# Patient Record
Sex: FEMALE | Race: BLACK OR AFRICAN AMERICAN | NOT HISPANIC OR LATINO | URBAN - METROPOLITAN AREA
[De-identification: names, ages, dates, MRNs, and addresses within clinical notes are randomized per-mention and may not be internally consistent; named-entity substitution may affect disease eponyms.]

---

## 2018-07-17 ENCOUNTER — HISTORICAL (OUTPATIENT)
Dept: ADMINISTRATIVE | Facility: HOSPITAL | Age: 18
End: 2018-07-17

## 2021-08-10 ENCOUNTER — HISTORICAL (OUTPATIENT)
Dept: ADMINISTRATIVE | Facility: HOSPITAL | Age: 21
End: 2021-08-10

## 2021-08-10 LAB — SARS-COV-2 RNA RESP QL NAA+PROBE: NOT DETECTED

## 2022-04-10 ENCOUNTER — HISTORICAL (OUTPATIENT)
Dept: ADMINISTRATIVE | Facility: HOSPITAL | Age: 22
End: 2022-04-10

## 2022-04-24 VITALS
WEIGHT: 293 LBS | SYSTOLIC BLOOD PRESSURE: 114 MMHG | HEIGHT: 67 IN | BODY MASS INDEX: 45.99 KG/M2 | OXYGEN SATURATION: 100 % | DIASTOLIC BLOOD PRESSURE: 81 MMHG

## 2024-02-15 DIAGNOSIS — K59.00 CONSTIPATION, UNSPECIFIED CONSTIPATION TYPE: Primary | ICD-10-CM

## 2024-09-05 DIAGNOSIS — R10.811 ABDOMINAL TENDERNESS, RIGHT UPPER QUADRANT: Primary | ICD-10-CM

## 2024-09-18 ENCOUNTER — HOSPITAL ENCOUNTER (OUTPATIENT)
Dept: RADIOLOGY | Facility: HOSPITAL | Age: 24
Discharge: HOME | End: 2024-09-18
Payer: MEDICAID

## 2024-09-18 DIAGNOSIS — R10.811 ABDOMINAL TENDERNESS, RIGHT UPPER QUADRANT: ICD-10-CM

## 2024-09-18 PROCEDURE — 76700 US EXAM ABDOM COMPLETE: CPT | Mod: TC

## 2024-12-20 ENCOUNTER — TELEPHONE (OUTPATIENT)
Dept: NUTRITION | Facility: HOSPITAL | Age: 24
End: 2024-12-20
Payer: MEDICAID

## 2024-12-20 NOTE — TELEPHONE ENCOUNTER
RDN attempted to contact pt regarding referral to schedule for nutrition appt. No answer; RDN left voicemail with contact information.

## 2025-02-17 ENCOUNTER — TELEPHONE (OUTPATIENT)
Dept: NUTRITION | Facility: HOSPITAL | Age: 25
End: 2025-02-17
Payer: MEDICAID

## 2025-02-26 NOTE — PROGRESS NOTES
Nutrition Note: 2025   Referring Provider: Christine Peterson FN*  Reason for visit: Obesity/Weight Management  and Pre-DM  Consultation Time: 60 Minutes     A = NUTRITION ASSESSMENT   Patient Information:    Librado Casillas  : 2000   24 y.o. female    Allergies/Intolerances: Lactose Intolerance  Social Data: lives with parents. Accompanied by  self .  Anthropometrics:     Wt: 119.5 kg                                   Ht   1.701 m  BMI: There is no height or weight on file to calculate BMI.  IBW: 61.2 kg (195% IBW)    Nutrition Risk: Patient doesn't meet criteriaat least 2 characteristics of the ASPEN/AND criteria for  Malnutrition in context of  Malnutrition    Energy Intake  doesn't meet criteria  Interpretation of Weight Loss: doesn't meet criteria  Physical Findings (Body Fat) : doesn't meet criteria  Physical Findings (Muscle Mass: doesn't meet criteria  Fluid Accumulation []: unable to assess  Other:     Supplements/Vitamins:    MVI/Supp: Reviewed  Drug/Nutrient interactions: Reviewed Activity Level:     Sedentary      Form of Activity:      Food/Nutrition-related hx:        Food Security  Is patient able to sufficiently able to prepare meals at home? [x] Yes  [] No []N/A  If no, does patient have help cooking, preparing, and serving meals at home? [] Yes  [] No [x] N/A    Diet/PO Recall:   Appetite: Good  Fluid Intake: Adequate  Diet Recall:  Breakfast: eggs, bread, pancakes   Lunch: meat rice and gravy  Dinner: meat rice and gravy  Snacks: 1-2x/day  Drinks: water, powerade  ONS:   N/V/C/D: No GI Symptoms Noted  Eating out: 0-1x/week.   Cultural/Spiritual/Personal Preferences: no pork, lactose intolerant  Patient Notes/Reports:  Pt concerned about wt gain since last year, about 10#,  Pt states have been eating more bread products. MD dx her with prediabetes and wants to try wt loss, so she may not have to start medications. Pt provide diet recall and see above. Pt states joining a gym  membership. Provide portion control handout, General Healthy Nutrition, exercise 4 times a week (cardio) and 2-3 times per week strength straining. Educated on consuming more fruits and vegetables, less sweets, which patient states has been eating at time, as well as whoel grains, lean meats. Verbalized understanding.  Except good compliance. Discuss goal setting with patient to help with wt management. Pt wants to los 20# by June 2025.   Medical Tests and Procedures:  Problem List[1]   No past medical history on file.  No past surgical history on file.    No family history on file.  Social History     Socioeconomic History    Marital status: Single     No current outpatient medications   Labs:    Lab Results   Component Value Date    WBC 3.6 (L) 01/02/2020    HGB 11.2 (L) 01/02/2020    HCT 39.1 01/02/2020     01/02/2020    K 3.9 01/02/2020    CALCIUM 9.1 01/02/2020         D = NUTRITION DIAGNOSIS    PES Statement:   Primary Problem: Overweight/Obesity  related to excessive energy intake and physical inactivity  as evidenced by diet recall.            I = NUTRITION INTERVENTION   Discussed healthy plate method on healthy eating, incorporating more fruits/vegetables, whole grains, and eliminating high calorie/sugary beverages.  Discussed sugary foods and/or beverages (potential health consequences of excessive sugar intake, ways to reduce sugar intake, healthy beverage alternatives, etc.). Discussed recommended servings of fruit/vegetable per day and food sources of such at age appropriate serving sizes.  Discussed nutrient-dense meals and snacks versus empty-calories.  Discussed recommended fiber intake and food sources of such. Discussed benefits of adequate hydration and recommended fluid intake. Discussed physical activity guidelines, per AAP, and its associated benefits.  Answered parents/patient's questions appropriately.      Patient  active and engaged during session and verbalized desire to make  changes. Contact information provided, understanding verbalized and compliance expected.   Estimated Energy/Fluid Requirements:   Weight used: .5 kg  Calories: 2390 kcal/day (20 kcal/kg)  Protein: 96 g/day (0.8 g/kg)  Fluid: 2390 mL/day (1 mL/kcal)    Education Materials Provided:   Nutrition Plan, Healthy Plate Method, Nutrition Facts Label, and Physical Activity Guide    Recommendations:   Ensure balanced eating pattern of 3 meals, 1-2 snacks daily. Avoid skipped meals.    Keep portions appropriate using body (fist, palm, etc)   Create nutrient-dense meals and snacks. Focus on adequate nutrition including lean proteins, whole grains/fiber, and increasing overall fruit/vegetable intake.    Avoid/limit fried foods, fast food, and high calorie beverages. Shoot for limiting fast food to 1x/week.    Consume zero-calorie, zero-sugary beverages and choose water more often (crystal light, unsweet tea, diet soda, G2, Powerade zero, vitamin water zero, and skim/1%milk)   Meet physical activity goal of 60 minutes daily.           M/E = NUTRITION MONITORING AND EVALUATION   Goal 1: Weight loss of 8# (5-10%) per month or up to 24 within 6 months.   Indicator: Weight/BMI    Goal 2: Diet recall shows decrease/improvements in high calorie foods/drinks and consuming balanced eating pattern including lean proteins, whole grains, and fruit/vegetables by next RD visit.   Indicator: Diet Recall     F/U:   2 Months    Communication with provider via Epic    Signature: Maria Isabel Thompson RDN, LDN          [1] There is no problem list on file for this patient.

## 2025-02-27 ENCOUNTER — NUTRITION (OUTPATIENT)
Dept: NUTRITION | Facility: HOSPITAL | Age: 25
End: 2025-02-27
Payer: MEDICAID

## 2025-02-27 DIAGNOSIS — R73.03 PRE-DIABETES: Primary | ICD-10-CM

## 2025-02-27 PROCEDURE — 97802 MEDICAL NUTRITION INDIV IN: CPT

## 2025-03-11 DIAGNOSIS — R10.2 PELVIC PAIN SYNDROME: Primary | ICD-10-CM

## 2025-03-11 DIAGNOSIS — N92.6 IRREGULAR MENSTRUAL CYCLE: ICD-10-CM

## 2025-03-24 ENCOUNTER — HOSPITAL ENCOUNTER (OUTPATIENT)
Dept: RADIOLOGY | Facility: HOSPITAL | Age: 25
Discharge: HOME | End: 2025-03-24
Attending: NURSE PRACTITIONER
Payer: MEDICAID

## 2025-03-24 DIAGNOSIS — R10.2 PELVIC PAIN SYNDROME: ICD-10-CM

## 2025-03-24 DIAGNOSIS — N92.6 IRREGULAR MENSTRUAL CYCLE: ICD-10-CM

## 2025-03-24 PROCEDURE — 76856 US EXAM PELVIC COMPLETE: CPT | Mod: TC

## 2025-04-05 ENCOUNTER — HOSPITAL ENCOUNTER (EMERGENCY)
Facility: HOSPITAL | Age: 25
Discharge: HOME | End: 2025-04-05
Attending: OBSTETRICS & GYNECOLOGY
Payer: MEDICAID

## 2025-04-05 VITALS
HEART RATE: 71 BPM | DIASTOLIC BLOOD PRESSURE: 82 MMHG | TEMPERATURE: 98 F | HEIGHT: 67 IN | RESPIRATION RATE: 18 BRPM | SYSTOLIC BLOOD PRESSURE: 139 MMHG | OXYGEN SATURATION: 100 % | WEIGHT: 256 LBS | BODY MASS INDEX: 40.18 KG/M2

## 2025-04-05 DIAGNOSIS — R07.9 CHEST PAIN: ICD-10-CM

## 2025-04-05 DIAGNOSIS — M94.0 COSTAL CHONDRITIS: Primary | ICD-10-CM

## 2025-04-05 LAB
ALBUMIN SERPL-MCNC: 3.5 G/DL (ref 3.5–5)
ALBUMIN/GLOB SERPL: 0.9 RATIO (ref 1.1–2)
ALP SERPL-CCNC: 78 UNIT/L (ref 40–150)
ALT SERPL-CCNC: 23 UNIT/L (ref 0–55)
ANION GAP SERPL CALC-SCNC: 8 MEQ/L
AST SERPL-CCNC: 22 UNIT/L (ref 11–45)
B-HCG UR QL: NEGATIVE
BACTERIA #/AREA URNS AUTO: ABNORMAL /HPF
BASOPHILS # BLD AUTO: 0.04 X10(3)/MCL
BASOPHILS NFR BLD AUTO: 0.5 %
BILIRUB SERPL-MCNC: 0.2 MG/DL
BILIRUB UR QL STRIP.AUTO: NEGATIVE
BNP BLD-MCNC: <10 PG/ML
BUN SERPL-MCNC: 13.9 MG/DL (ref 7–18.7)
CALCIUM SERPL-MCNC: 9.2 MG/DL (ref 8.4–10.2)
CHLORIDE SERPL-SCNC: 110 MMOL/L (ref 98–107)
CLARITY UR: CLEAR
CO2 SERPL-SCNC: 22 MMOL/L (ref 22–29)
COLOR UR AUTO: ABNORMAL
CREAT SERPL-MCNC: 0.89 MG/DL (ref 0.55–1.02)
CREAT/UREA NIT SERPL: 16
EOSINOPHIL # BLD AUTO: 0.14 X10(3)/MCL (ref 0–0.9)
EOSINOPHIL NFR BLD AUTO: 1.8 %
ERYTHROCYTE [DISTWIDTH] IN BLOOD BY AUTOMATED COUNT: 15.7 % (ref 11.5–17)
GFR SERPLBLD CREATININE-BSD FMLA CKD-EPI: >60 ML/MIN/1.73/M2
GLOBULIN SER-MCNC: 3.7 GM/DL (ref 2.4–3.5)
GLUCOSE SERPL-MCNC: 81 MG/DL (ref 74–100)
GLUCOSE UR QL STRIP: NORMAL
HCT VFR BLD AUTO: 36.3 % (ref 37–47)
HGB BLD-MCNC: 11.3 G/DL (ref 12–16)
HGB UR QL STRIP: NEGATIVE
IMM GRANULOCYTES # BLD AUTO: 0.02 X10(3)/MCL (ref 0–0.04)
IMM GRANULOCYTES NFR BLD AUTO: 0.3 %
KETONES UR QL STRIP: NEGATIVE
LEUKOCYTE ESTERASE UR QL STRIP: NEGATIVE
LYMPHOCYTES # BLD AUTO: 2.41 X10(3)/MCL (ref 0.6–4.6)
LYMPHOCYTES NFR BLD AUTO: 30.8 %
MCH RBC QN AUTO: 27.1 PG (ref 27–31)
MCHC RBC AUTO-ENTMCNC: 31.1 G/DL (ref 33–36)
MCV RBC AUTO: 87.1 FL (ref 80–94)
MONOCYTES # BLD AUTO: 0.53 X10(3)/MCL (ref 0.1–1.3)
MONOCYTES NFR BLD AUTO: 6.8 %
MUCOUS THREADS URNS QL MICRO: ABNORMAL /LPF
NEUTROPHILS # BLD AUTO: 4.68 X10(3)/MCL (ref 2.1–9.2)
NEUTROPHILS NFR BLD AUTO: 59.8 %
NITRITE UR QL STRIP: NEGATIVE
NRBC BLD AUTO-RTO: 0 %
PH UR STRIP: 6.5 [PH]
PLATELET # BLD AUTO: 230 X10(3)/MCL (ref 130–400)
PMV BLD AUTO: 11.1 FL (ref 7.4–10.4)
POTASSIUM SERPL-SCNC: 3.7 MMOL/L (ref 3.5–5.1)
PROT SERPL-MCNC: 7.2 GM/DL (ref 6.4–8.3)
PROT UR QL STRIP: NEGATIVE
RBC # BLD AUTO: 4.17 X10(6)/MCL (ref 4.2–5.4)
RBC #/AREA URNS AUTO: ABNORMAL /HPF
SODIUM SERPL-SCNC: 140 MMOL/L (ref 136–145)
SP GR UR STRIP.AUTO: 1.03 (ref 1–1.03)
SQUAMOUS #/AREA URNS LPF: ABNORMAL /HPF
TROPONIN I SERPL-MCNC: <0.01 NG/ML (ref 0–0.04)
UROBILINOGEN UR STRIP-ACNC: NORMAL
WBC # BLD AUTO: 7.82 X10(3)/MCL (ref 4.5–11.5)
WBC #/AREA URNS AUTO: ABNORMAL /HPF

## 2025-04-05 PROCEDURE — 81025 URINE PREGNANCY TEST: CPT

## 2025-04-05 PROCEDURE — 93005 ELECTROCARDIOGRAM TRACING: CPT

## 2025-04-05 PROCEDURE — 85025 COMPLETE CBC W/AUTO DIFF WBC: CPT

## 2025-04-05 PROCEDURE — 83880 ASSAY OF NATRIURETIC PEPTIDE: CPT

## 2025-04-05 PROCEDURE — 81001 URINALYSIS AUTO W/SCOPE: CPT

## 2025-04-05 PROCEDURE — 93010 ELECTROCARDIOGRAM REPORT: CPT | Mod: ,,, | Performed by: INTERNAL MEDICINE

## 2025-04-05 PROCEDURE — 80053 COMPREHEN METABOLIC PANEL: CPT

## 2025-04-05 PROCEDURE — 84484 ASSAY OF TROPONIN QUANT: CPT

## 2025-04-05 PROCEDURE — 99285 EMERGENCY DEPT VISIT HI MDM: CPT | Mod: 25

## 2025-04-05 RX ORDER — DICLOFENAC SODIUM 50 MG/1
50 TABLET, DELAYED RELEASE ORAL 3 TIMES DAILY PRN
Qty: 15 TABLET | Refills: 0 | Status: SHIPPED | OUTPATIENT
Start: 2025-04-05 | End: 2025-04-10

## 2025-04-06 LAB
OHS QRS DURATION: 86 MS
OHS QTC CALCULATION: 429 MS

## 2025-04-06 NOTE — ED PROVIDER NOTES
Encounter Date: 4/5/2025       History     Chief Complaint   Patient presents with    Shortness of Breath     Pt states that she has been experiencing CP since last wk. Pt states that the urgent care prescribed her steroids and ibuprofen no relief with symptoms. Pt states that she is SOB with talking and moving around. Pt denies PMHx      See MDM    The history is provided by the patient. No  was used.     Review of patient's allergies indicates:  No Known Allergies  No past medical history on file.  No past surgical history on file.  No family history on file.  Social History[1]  Review of Systems   Constitutional:  Negative for fever.   Respiratory:  Positive for shortness of breath. Negative for cough.    Cardiovascular:  Positive for chest pain.   Gastrointestinal:  Negative for abdominal pain.   Genitourinary:  Negative for difficulty urinating and dysuria.   Musculoskeletal:  Negative for gait problem.   Skin:  Negative for color change.   Neurological:  Negative for dizziness, speech difficulty and headaches.   Psychiatric/Behavioral:  Negative for hallucinations and suicidal ideas.    All other systems reviewed and are negative.      Physical Exam     Initial Vitals [04/05/25 2010]   BP Pulse Resp Temp SpO2   126/85 81 16 97.5 °F (36.4 °C) 100 %      MAP       --         Physical Exam    Nursing note and vitals reviewed.  Constitutional: She appears well-developed and well-nourished.   HENT:   Head: Normocephalic.   Eyes: EOM are normal.   Neck:   Normal range of motion.  Cardiovascular:  Normal rate, regular rhythm, normal heart sounds and intact distal pulses.           Pulmonary/Chest: Breath sounds normal. No respiratory distress. She exhibits tenderness.   Abdominal: Abdomen is soft. Bowel sounds are normal. There is no abdominal tenderness.   Musculoskeletal:         General: Normal range of motion.      Cervical back: Normal range of motion.     Neurological: She is alert and  oriented to person, place, and time. She has normal strength.   Skin: Skin is warm and dry.   Psychiatric: She has a normal mood and affect. Her behavior is normal. Judgment and thought content normal.         ED Course   Procedures  Labs Reviewed   COMPREHENSIVE METABOLIC PANEL - Abnormal       Result Value    Sodium 140      Potassium 3.7      Chloride 110 (*)     CO2 22      Glucose 81      Blood Urea Nitrogen 13.9      Creatinine 0.89      Calcium 9.2      Protein Total 7.2      Albumin 3.5      Globulin 3.7 (*)     Albumin/Globulin Ratio 0.9 (*)     Bilirubin Total 0.2      ALP 78      ALT 23      AST 22      eGFR >60      Anion Gap 8.0      BUN/Creatinine Ratio 16     URINALYSIS, REFLEX TO URINE CULTURE - Abnormal    Color, UA Light-Yellow      Appearance, UA Clear      Specific Gravity, UA 1.030      pH, UA 6.5      Protein, UA Negative      Glucose, UA Normal      Ketones, UA Negative      Blood, UA Negative      Bilirubin, UA Negative      Urobilinogen, UA Normal      Nitrites, UA Negative      Leukocyte Esterase, UA Negative      RBC, UA 0-5      WBC, UA 0-5      Bacteria, UA Trace      Squamous Epithelial Cells, UA Trace      Mucous, UA Trace (*)    CBC WITH DIFFERENTIAL - Abnormal    WBC 7.82      RBC 4.17 (*)     Hgb 11.3 (*)     Hct 36.3 (*)     MCV 87.1      MCH 27.1      MCHC 31.1 (*)     RDW 15.7      Platelet 230      MPV 11.1 (*)     Neut % 59.8      Lymph % 30.8      Mono % 6.8      Eos % 1.8      Basophil % 0.5      Imm Grans % 0.3      Neut # 4.68      Lymph # 2.41      Mono # 0.53      Eos # 0.14      Baso # 0.04      Imm Gran # 0.02      NRBC% 0.0     B-TYPE NATRIURETIC PEPTIDE - Normal    Natriuretic Peptide <10.0     TROPONIN I - Normal    Troponin-I <0.010     PREGNANCY TEST, URINE RAPID - Normal    hCG Qualitative, Urine Negative     CBC W/ AUTO DIFFERENTIAL    Narrative:     The following orders were created for panel order CBC auto differential.  Procedure                                Abnormality         Status                     ---------                               -----------         ------                     CBC with Differential[[id number]]       Abnormal            Final result                 Please view results for these tests on the individual orders.     EKG Readings: (Independently Interpreted)   Rhythm: Normal Sinus Rhythm. Heart Rate: 76. Ectopy: No Ectopy. Conduction: Normal. ST Segments: Normal ST Segments. T Waves: Normal. Axis: Normal. Clinical Impression: Normal Sinus Rhythm       Imaging Results              X-Ray Chest PA And Lateral (Final result)  Result time 04/05/25 21:02:45      Final result by Atif Vila MD (04/05/25 21:02:45)                   Impression:      No acute cardiopulmonary process identified.      Electronically signed by: Atif Vila  Date:    04/05/2025  Time:    21:02               Narrative:    EXAMINATION:  XR CHEST PA AND LATERAL    CLINICAL HISTORY:  Chest pain, unspecified    TECHNIQUE:  Two-view    COMPARISON:  August 18, 2018..    FINDINGS:  Cardiopericardial silhouette is within normal limits. Lungs are without dense focal or segmental consolidation, congestion, pleural effusion or pneumothorax.                                       Medications - No data to display  Medical Decision Making  Historian:  Patient.  Patient is a Black or  24 y.o. female that presents with chest pain that has been present 1 week. Associated symptoms shortness of breath or hurts to breathe. Surrounding information is did go to urgent care and was put on Medrol Dosepak and anti-inflammatory. Exacerbated by palpation. Relieved by nothing. Patient treatment prior to arrival none. Risk factors include none. Other history pertaining to this complaint nothing.   Assessment:  See physical exam.  DD:  Costochondritis, pneumothorax, acute coronary syndrome  ED Course: History was obtained.  Physical was performed.  Patient appears to have  costochondritis. Medical or surgical consults:  None. Social determinants that affect healthcare:  None.       Amount and/or Complexity of Data Reviewed  Labs:      Details: Labs unremarkable  Radiology: independent interpretation performed.     Details: X-ray no acute findings  ECG/medicine tests: independent interpretation performed.     Details: See EKG section    Risk  Prescription drug management.  Risk Details: Diclofenac                                      Clinical Impression:  Final diagnoses:  [R07.9] Chest pain  [M94.0] Costal chondritis (Primary)          ED Disposition Condition    Discharge Stable          ED Prescriptions       Medication Sig Dispense Start Date End Date Auth. Provider    diclofenac (VOLTAREN) 50 MG EC tablet Take 1 tablet (50 mg total) by mouth 3 (three) times daily as needed (pain). 15 tablet 4/5/2025 4/10/2025 Hermes Peralta FNP          Follow-up Information       Follow up With Specialties Details Why Contact Info    Your Primary Care Provider  Call in 3 days ed follow up                  [1]         Hermes Peralta FNP  04/05/25 [id number]

## 2025-04-06 NOTE — FIRST PROVIDER EVALUATION
"Medical screening examination initiated.  I have conducted a focused provider triage encounter, findings are as follows:    Brief history of present illness:  24 year old female presents to ER with c/o chest pain and SOB x 1 week. States she was seen at urgent care last week and prescribed steroids and ibuprofen.     Vitals:    04/05/25 2010   BP: 126/85   BP Location: Left arm   Pulse: 81   Resp: 16   Temp: 97.5 °F (36.4 °C)   TempSrc: Oral   SpO2: 100%   Weight: 116.1 kg (256 lb)   Height: 5' 7" (1.702 m)       Pertinent physical exam:  Awake and alert, NAD    Brief workup plan:  Labs, EKG, CXR    Preliminary workup initiated; this workup will be continued and followed by the physician or advanced practice provider that is assigned to the patient when roomed.  "